# Patient Record
Sex: MALE | Employment: FULL TIME | URBAN - METROPOLITAN AREA
[De-identification: names, ages, dates, MRNs, and addresses within clinical notes are randomized per-mention and may not be internally consistent; named-entity substitution may affect disease eponyms.]

---

## 2018-10-16 RX ORDER — MONTELUKAST SODIUM 10 MG/1
TABLET ORAL
Qty: 90 TAB | Refills: 1 | Status: SHIPPED | OUTPATIENT
Start: 2018-10-16 | End: 2019-04-05 | Stop reason: SDUPTHER

## 2018-10-18 ENCOUNTER — CLINICAL SUPPORT (OUTPATIENT)
Dept: FAMILY MEDICINE CLINIC | Age: 23
End: 2018-10-18

## 2018-10-18 DIAGNOSIS — Z23 ENCOUNTER FOR IMMUNIZATION: Primary | ICD-10-CM

## 2018-10-18 NOTE — PROGRESS NOTES
Pasha Lin is a 26 y.o. male who presents for routine immunizations. He denies any symptoms , reactions or allergies that would exclude them from being immunized today. Risks and adverse reactions were discussed and the VIS was given to them. All questions were addressed. He was observed for 15   min post injection. There were no reactions observed.     Saloni Benitez LPN

## 2018-11-05 ENCOUNTER — OFFICE VISIT (OUTPATIENT)
Dept: FAMILY MEDICINE CLINIC | Age: 23
End: 2018-11-05

## 2018-11-05 VITALS
TEMPERATURE: 98.2 F | HEIGHT: 73 IN | DIASTOLIC BLOOD PRESSURE: 73 MMHG | OXYGEN SATURATION: 100 % | RESPIRATION RATE: 12 BRPM | SYSTOLIC BLOOD PRESSURE: 119 MMHG | HEART RATE: 80 BPM | WEIGHT: 168 LBS | BODY MASS INDEX: 22.26 KG/M2

## 2018-11-05 DIAGNOSIS — K62.5 RECTAL BLEEDING: Primary | ICD-10-CM

## 2018-11-05 LAB
HEMOCCULT STL QL: NEGATIVE
HGB BLD-MCNC: 14.7 G/DL
VALID INTERNAL CONTROL?: YES

## 2018-11-05 RX ORDER — GUANFACINE 4 MG/1
TABLET, EXTENDED RELEASE ORAL DAILY
COMMUNITY

## 2018-11-05 RX ORDER — ALBUTEROL SULFATE 90 UG/1
AEROSOL, METERED RESPIRATORY (INHALATION)
COMMUNITY
End: 2019-09-17 | Stop reason: SDUPTHER

## 2018-11-05 RX ORDER — GUANFACINE HYDROCHLORIDE 2 MG/1
TABLET ORAL DAILY
COMMUNITY
End: 2018-11-05

## 2018-11-05 RX ORDER — LORATADINE 10 MG/1
10 TABLET ORAL
COMMUNITY

## 2018-11-05 NOTE — PATIENT INSTRUCTIONS
Rectal Bleeding: Care Instructions  Your Care Instructions    Rectal bleeding in small amounts is common. You may see red spotting on toilet paper or drops of blood in the toilet. Rectal bleeding has many possible causes, from something as minor as hemorrhoids to something as serious as colon cancer. You may need more tests to find the cause of your bleeding. Follow-up care is a key part of your treatment and safety. Be sure to make and go to all appointments, and call your doctor if you are having problems. It's also a good idea to know your test results and keep a list of the medicines you take. How can you care for yourself at home? · Avoid aspirin and other nonsteroidal anti-inflammatory drugs (NSAIDs), such as ibuprofen (Advil, Motrin) and naproxen (Aleve). They can cause you to bleed more. Ask your doctor if you can take acetaminophen (Tylenol). Read and follow all instructions on the label. · Use a stool softener that contains bran or psyllium. You can save money by buying bran or psyllium (available in bulk at most health food stores) and sprinkling it on foods or stirring it into fruit juice. You can also use a product such as Metamucil or Citrucel. · Take your medicines exactly as directed. Call your doctor if you think you are having a problem with your medicine. When should you call for help? Call 911 anytime you think you may need emergency care. For example, call if:    · You passed out (lost consciousness).    Call your doctor now or seek immediate medical care if:    · You have new or worse pain.     · You have new or worse bleeding from the rectum.     · You are dizzy or light-headed, or you feel like you may faint.    Watch closely for changes in your health, and be sure to contact your doctor if:    · You cannot pass stools or gas.     · You do not get better as expected. Where can you learn more? Go to http://jair-tona.info/.   Enter Y789 in the search box to learn more about \"Rectal Bleeding: Care Instructions. \"  Current as of: March 28, 2018  Content Version: 11.8  © 7275-2203 [employer]wise, [employer]. Care instructions adapted under license by [employer] (which disclaims liability or warranty for this information). If you have questions about a medical condition or this instruction, always ask your healthcare professional. [address] any warranty or liability for your use of this information.

## 2018-11-05 NOTE — PROGRESS NOTES
I have reviewed the notes, assessments, and/or procedures performed, I concur with the residents documentation of Pasha Lin.

## 2018-11-05 NOTE — PROGRESS NOTES
Identified pt with two pt identifiers(name and ). No chief complaint on file. Health Maintenance Due   Topic    DTaP/Tdap/Td series (1 - Tdap)       Wt Readings from Last 3 Encounters:   18 168 lb (76.2 kg)     Temp Readings from Last 3 Encounters:   18 98.2 °F (36.8 °C) (Oral)     BP Readings from Last 3 Encounters:   18 119/73     Pulse Readings from Last 3 Encounters:   18 80         Learning Assessment:  :     No flowsheet data found. Depression Screening:  :     No flowsheet data found. Fall Risk Assessment:  :     No flowsheet data found. Abuse Screening:  :     No flowsheet data found. Coordination of Care Questionnaire:  :     1) Have you been to an emergency room, urgent care clinic since your last NO  2) Have you seen or consulted any other health care providers outside of [address] [place] [address] since your last visit? NO      Patient is accompanied by self and mother I have received verbal consent from Nargis Taveras to discuss any/all medical information while they are present in the room.

## 2018-11-05 NOTE — PROGRESS NOTES
Gayathri Sanchez is a 26 y.o. male who presents today for rectal bleeding. Patient presents today for BRBPR. Patient states that last Sunday he noticed blood in toilet water. This lasted until for 3 days and resolved. Most of the toilet water was bright red. This is not the first time this has happened. Previously he thought it was due to Topamax at the time. Reports some bright red blood in the stool as well and on the toilet paper. This only occurred during bowel movements. Has a BM daily. Stool not hard and declines constipation. No family history of colon cancer. No history of hemorrhoids. Denies cp, sob, abd pain, n/v, d/c.      ROS: (positive in bold)  General: wt loss, fever, chills, fatigue   Cardiac: chest pain, palpitations  Pul: SOB  GI: SEE HPI  Neuro: headache, lightheaded, dizziness. Past Medical History:  Past Medical History:   Diagnosis Date    Asthma     Headache        Past Surgical History:  Past Surgical History:   Procedure Laterality Date    HX APPENDECTOMY      HX ORTHOPAEDIC      FOREIGN BODY REMOVED FROM WRIST       Family History:  No family history on file. Allergies:  No Known Allergies    Social History:  Social History     Tobacco Use    Smoking status: Never Smoker    Smokeless tobacco: Never Used   Substance Use Topics    Alcohol use: Yes     Frequency: Monthly or less    Drug use: No       Current Meds:  Current Outpatient Medications on File Prior to Visit   Medication Sig Dispense Refill    loratadine (CLARITIN) 10 mg tablet Take 10 mg by mouth.  guanFACINE ER (INTUNIV) 4 mg Tb24 ER tablet Take  by mouth daily.  albuterol (PROVENTIL HFA, VENTOLIN HFA, PROAIR HFA) 90 mcg/actuation inhaler Take  by inhalation.  montelukast (SINGULAIR) 10 mg tablet TAKE 1 TABLET BY MOUTH EVERY DAY 90 Tab 1     No current facility-administered medications on file prior to visit.          Visit Vitals  /73   Pulse 80   Temp 98.2 °F (36.8 °C) (Oral)   Resp 12 Ht 6' 1\" (1.854 m)   Wt 168 lb (76.2 kg)   SpO2 100%   BMI 22.16 kg/m²       Gen:  Well developed, well nourished male in no acute distress  HEENT: normocephalic/atraumatic;EOMI  Card:  RRR, no m/r/g  Chest:  CTAB, no w/r/r  Abd:  BS+, Soft, nontender/nondistended. Normal rectal tone. No external hemorrhoids visualized. No masses palpated on exam. FOBT negative. Psych:  Nl mood and affect     Recent Results (from the past 12 hour(s))   AMB POC OCCULT, OTHER SOURCE    Collection Time: 11/05/18  4:45 PM   Result Value Ref Range    VALID INTERNAL CONTROL POC Yes     Hemoccult (POC) Negative Negative   AMB POC HEMOGLOBIN (HGB)    Collection Time: 11/05/18  4:50 PM   Result Value Ref Range    Hemoglobin (POC) 14.7            Assessment:      ICD-10-CM ICD-9-CM    1. Rectal bleeding K62.5 569.3 AMB POC HEMOGLOBIN (HGB)      REFERRAL TO GASTROENTEROLOGY      AMB POC OCCULT, OTHER SOURCE      Recent episode of BRBPR. DDx: hemorrhoids, crohn's, IBS, ulcer, colon cancer. FOBT negative in clinic today. Hgb was 14.7. Normal rectal tone without hemorrhoids on physical exam today. No family history of Colon Cancer. Mother does have IBS. Will send to GI for further evaluation given this is his second episode. He has to go to [facility] based on insurance. Referral given to patient. Discussed signs and symptoms of worsening bleeding and when to go to the emergency room. Plan:  - Referral to GI for further evaluation  - Return to clinic or go to the emergency room if bleeding persists or worsen. - Avoid NSAIDS. I have discussed the diagnosis with the patient and the intended plan as seen in the above orders. The patient has received an after-visit summary and questions were answered concerning future plans. The patient agrees and understands above plan. Follow-up Disposition:  Return if symptoms worsen or fail to improve. Patient discussed with supervising attending.     Angela Laurent DO

## 2019-04-05 RX ORDER — MONTELUKAST SODIUM 10 MG/1
TABLET ORAL
Qty: 90 TAB | Refills: 1 | Status: SHIPPED | OUTPATIENT
Start: 2019-04-05 | End: 2019-09-02 | Stop reason: SDUPTHER

## 2019-09-03 RX ORDER — MONTELUKAST SODIUM 10 MG/1
TABLET ORAL
Qty: 90 TAB | Refills: 1 | Status: SHIPPED | OUTPATIENT
Start: 2019-09-03 | End: 2019-09-17 | Stop reason: SDUPTHER

## 2019-09-17 ENCOUNTER — OFFICE VISIT (OUTPATIENT)
Dept: FAMILY MEDICINE CLINIC | Age: 24
End: 2019-09-17

## 2019-09-17 VITALS
TEMPERATURE: 97.7 F | DIASTOLIC BLOOD PRESSURE: 71 MMHG | HEART RATE: 74 BPM | OXYGEN SATURATION: 99 % | RESPIRATION RATE: 12 BRPM | SYSTOLIC BLOOD PRESSURE: 108 MMHG | WEIGHT: 185 LBS | BODY MASS INDEX: 24.52 KG/M2 | HEIGHT: 73 IN

## 2019-09-17 DIAGNOSIS — J45.20 MILD INTERMITTENT ASTHMA WITHOUT COMPLICATION: ICD-10-CM

## 2019-09-17 DIAGNOSIS — Z00.00 ANNUAL PHYSICAL EXAM: Primary | ICD-10-CM

## 2019-09-17 DIAGNOSIS — Z23 ENCOUNTER FOR IMMUNIZATION: ICD-10-CM

## 2019-09-17 DIAGNOSIS — F95.2 TOURETTE SYNDROME: ICD-10-CM

## 2019-09-17 DIAGNOSIS — K62.5 RECTAL BLEEDING: ICD-10-CM

## 2019-09-17 RX ORDER — MONTELUKAST SODIUM 10 MG/1
TABLET ORAL
Qty: 90 TAB | Refills: 1 | Status: SHIPPED | OUTPATIENT
Start: 2019-09-17 | End: 2020-03-27 | Stop reason: SDUPTHER

## 2019-09-17 RX ORDER — PIMOZIDE 1 MG/1
1 TABLET ORAL
COMMUNITY

## 2019-09-17 RX ORDER — ALBUTEROL SULFATE 90 UG/1
1 AEROSOL, METERED RESPIRATORY (INHALATION)
Qty: 1 INHALER | Refills: 2 | Status: SHIPPED | OUTPATIENT
Start: 2019-09-17 | End: 2020-03-27 | Stop reason: SDUPTHER

## 2019-09-17 RX ORDER — BACLOFEN 10 MG/1
TABLET ORAL
Refills: 11 | COMMUNITY
Start: 2019-08-14

## 2019-09-17 NOTE — PROGRESS NOTES
Patient stated name &     Chief Complaint   Patient presents with    Complete Physical     Requesting Flu injection        Health Maintenance Due   Topic    DTaP/Tdap/Td series (1 - Tdap)    Influenza Age 5 to Adult        Wt Readings from Last 3 Encounters:   19 185 lb (83.9 kg)   18 168 lb (76.2 kg)     Temp Readings from Last 3 Encounters:   19 97.7 °F (36.5 °C) (Oral)   18 98.2 °F (36.8 °C) (Oral)     BP Readings from Last 3 Encounters:   19 108/71   18 119/73     Pulse Readings from Last 3 Encounters:   19 74   18 80         Learning Assessment:  :     No flowsheet data found. Depression Screening:  :     3 most recent PHQ Screens 2019   Little interest or pleasure in doing things Not at all   Feeling down, depressed, irritable, or hopeless Not at all   Total Score PHQ 2 0       Fall Risk Assessment:  :     No flowsheet data found. Abuse Screening:  :     No flowsheet data found. Coordination of Care Questionnaire:  :     1) Have you been to an emergency room, urgent care clinic since your last visit? No    Hospitalized since your last visit? No             2) Have you seen or consulted any other health care providers outside of [address] [place] [address] since your last visit? No  (Include any pap smears or colon screenings in this section.)    Patient is accompanied by mother I have received verbal consent from Anuja Huggins to discuss any/all medical information while they are present in the room. Flu injection ordered by Francisca Corbin NP given 2019 by Elsa Mendoza as follows:    Dose amount:  0.5 ml  Injection site:  deltoid ( left)  Route:  IM  Lot:  [id number]  Exp:  20  NDC:  62269-834-56  [employer]      Patient tolerated injection well.

## 2019-09-17 NOTE — PROGRESS NOTES
Assessment/Plan:     Diagnoses and all orders for this visit:    1. Annual physical exam   -stable, continue active lifestyle. Follow up in 1 year for CPE    2. Encounter for immunization  -     INFLUENZA VIRUS VAC QUAD,SPLIT,PRESV FREE SYRINGE IM    3. Tourette syndrome   -managed by specialist    4. Mild intermittent asthma without complication  -     albuterol (PROVENTIL HFA, VENTOLIN HFA, PROAIR HFA) 90 mcg/actuation inhaler; Take 1 Puff by inhalation every four (4) hours as needed for Wheezing.  -     montelukast (SINGULAIR) 10 mg tablet; TAKE 1 TABLET BY MOUTH EVERY DAY  - Stable, continue current therapy    5. Rectal bleeding  - Stable, no recent bleeding. scheduled with GI at [facility]            Discussed expected course/resolution/complications of diagnosis in detail with patient. Medication risks/benefits/costs/interactions/alternatives discussed with patient. Pt was given after visit summary which includes diagnoses, current medications & vitals. Pt expressed understanding with the diagnosis and plan        Subjective:      Yelena Ortiz is a 22 y.o. male who presents for had concerns including Complete Physical (Requesting Flu injection). Here today for annual CPE. History of tourette's syndrome managed by [facility]. Sees dentist every 6 months. Last vision exam has been years he states and denies problem with vision. States he does not exercise. States diet is fairly balanced. Minimal alcohol use. Denies smoking    Has a history of rectal bleeding. Has not seen GI in the past, has made an apt. POC Occult was  Negative and HGB was 14.7. States the bleeding varies and it is not every day. Denies diarrhea, constipation, abdominal pain, fevers, acid reflux. Never been diagnosed with hemorrhoids. No bleeding in the last 2 weeks.      Current Outpatient Medications   Medication Sig Dispense Refill    baclofen (LIORESAL) 10 mg tablet TAKE 1 TABLET BY MOUTH 3 TIMES A DAY AS NEEDED FOR MOTOR TICS  11    pimozide (ORAP) 1 mg tablet Take 1 mg by mouth.  albuterol (PROVENTIL HFA, VENTOLIN HFA, PROAIR HFA) 90 mcg/actuation inhaler Take 1 Puff by inhalation every four (4) hours as needed for Wheezing. 1 Inhaler 2    montelukast (SINGULAIR) 10 mg tablet TAKE 1 TABLET BY MOUTH EVERY DAY 90 Tab 1    loratadine (CLARITIN) 10 mg tablet Take 10 mg by mouth.  guanFACINE ER (INTUNIV) 4 mg Tb24 ER tablet Take  by mouth daily. Allergies   Allergen Reactions    Atomoxetine Hives    Penicillins Hives    Sulfa (Sulfonamide Antibiotics) Hives     Past Medical History:   Diagnosis Date    Asthma     Headache      Past Surgical History:   Procedure Laterality Date    HX APPENDECTOMY      HX ORTHOPAEDIC      FOREIGN BODY REMOVED FROM WRIST     No family history on file.   Social History     Socioeconomic History    Marital status: UNKNOWN     Spouse name: Not on file    Number of children: Not on file    Years of education: Not on file    Highest education level: Not on file   Occupational History    Not on file   Social Needs    Financial resource strain: Not on file    Food insecurity:     Worry: Not on file     Inability: Not on file    Transportation needs:     Medical: Not on file     Non-medical: Not on file   Tobacco Use    Smoking status: Never Smoker    Smokeless tobacco: Never Used   Substance and Sexual Activity    Alcohol use: Yes     Frequency: Monthly or less    Drug use: No    Sexual activity: Never   Lifestyle    Physical activity:     Days per week: Not on file     Minutes per session: Not on file    Stress: Not on file   Relationships    Social connections:     Talks on phone: Not on file     Gets together: Not on file     Attends [employer] service: Not on file     Active member of club or organization: Not on file     Attends meetings of clubs or organizations: Not on file     Relationship status: Not on file    Intimate partner violence:     Fear of current or ex partner: Not on file     Emotionally abused: Not on file     Physically abused: Not on file     Forced sexual activity: Not on file   Other Topics Concern    Not on file   Social History Narrative    Not on file       HPI      ROS:   Review of Systems   Constitutional: Negative for chills, fever and malaise/fatigue. HENT: Negative for congestion. Respiratory: Negative for cough and shortness of breath. Cardiovascular: Negative for chest pain, palpitations and leg swelling. Gastrointestinal: Positive for blood in stool. Negative for abdominal pain, constipation, diarrhea, heartburn, melena, nausea and vomiting. Genitourinary: Negative for dysuria, flank pain, frequency, hematuria and urgency. Musculoskeletal: Negative for back pain. Neurological: Positive for headaches. Negative for dizziness. Psychiatric/Behavioral: Negative for depression. The patient is nervous/anxious. Objective:     Visit Vitals  /71   Pulse 74   Temp 97.7 °F (36.5 °C) (Oral)   Resp 12   Ht 6' 1\" (1.854 m)   Wt 185 lb (83.9 kg)   SpO2 99%   BMI 24.41 kg/m²         Vitals and Nurse Documentation reviewed. Physical Exam   Constitutional: He is well-developed, well-nourished, and in no distress. HENT:   Head: Normocephalic and atraumatic. Right Ear: Tympanic membrane normal.   Left Ear: Tympanic membrane normal.   Nose: Nose normal.   Mouth/Throat: Oropharynx is clear and moist. Normal dentition. Eyes: Pupils are equal, round, and reactive to light. EOM are normal. Right eye exhibits no discharge. Left eye exhibits no discharge. Right conjunctiva is not injected. Left conjunctiva is not injected. Neck: Neck supple. Carotid bruit is not present. No thyroid mass and no thyromegaly present. Cardiovascular: Normal rate, regular rhythm, S1 normal and S2 normal. Exam reveals no gallop and no friction rub. No murmur heard.   Pulses:       Dorsalis pedis pulses are 2+ on the right side, and 2+ on the left side.        Posterior tibial pulses are 2+ on the right side, and 2+ on the left side. Pulmonary/Chest: Breath sounds normal.   Abdominal: Normal appearance and bowel sounds are normal. He exhibits no distension and no mass. There is no hepatosplenomegaly. There is no tenderness. Musculoskeletal: Normal range of motion. Lymphadenopathy:     He has no cervical adenopathy. Neurological: He is alert. He has normal sensation and normal strength. Gait normal. Gait normal.   Skin: Skin is warm, dry and intact. No rash noted.    Psychiatric: Mood and affect normal.       Results for orders placed or performed in visit on 11/05/18   AMB POC HEMOGLOBIN (HGB)   Result Value Ref Range    Hemoglobin (POC) 14.7    AMB POC OCCULT, OTHER SOURCE   Result Value Ref Range    VALID INTERNAL CONTROL POC Yes     Hemoccult (POC) Negative Negative

## 2020-03-27 DIAGNOSIS — J45.20 MILD INTERMITTENT ASTHMA WITHOUT COMPLICATION: ICD-10-CM

## 2020-03-27 RX ORDER — MONTELUKAST SODIUM 10 MG/1
TABLET ORAL
Qty: 90 TAB | Refills: 1 | Status: SHIPPED | OUTPATIENT
Start: 2020-03-27 | End: 2020-09-21 | Stop reason: SDUPTHER

## 2020-03-27 RX ORDER — ALBUTEROL SULFATE 90 UG/1
1 AEROSOL, METERED RESPIRATORY (INHALATION)
Qty: 1 INHALER | Refills: 2 | Status: SHIPPED | OUTPATIENT
Start: 2020-03-27 | End: 2020-07-10 | Stop reason: SDUPTHER

## 2020-07-10 DIAGNOSIS — J45.20 MILD INTERMITTENT ASTHMA WITHOUT COMPLICATION: ICD-10-CM

## 2020-07-10 RX ORDER — ALBUTEROL SULFATE 90 UG/1
1 AEROSOL, METERED RESPIRATORY (INHALATION)
Qty: 1 INHALER | Refills: 2 | Status: SHIPPED | OUTPATIENT
Start: 2020-07-10 | End: 2020-10-19 | Stop reason: SDUPTHER

## 2020-09-21 DIAGNOSIS — J45.20 MILD INTERMITTENT ASTHMA WITHOUT COMPLICATION: ICD-10-CM

## 2020-09-22 RX ORDER — MONTELUKAST SODIUM 10 MG/1
TABLET ORAL
Qty: 30 TAB | Refills: 0 | Status: SHIPPED | OUTPATIENT
Start: 2020-09-22 | End: 2020-10-19 | Stop reason: SDUPTHER

## 2020-10-19 ENCOUNTER — OFFICE VISIT (OUTPATIENT)
Dept: FAMILY MEDICINE CLINIC | Age: 25
End: 2020-10-19
Payer: COMMERCIAL

## 2020-10-19 VITALS
HEIGHT: 73 IN | TEMPERATURE: 98.1 F | OXYGEN SATURATION: 97 % | WEIGHT: 179.2 LBS | BODY MASS INDEX: 23.75 KG/M2 | DIASTOLIC BLOOD PRESSURE: 77 MMHG | HEART RATE: 79 BPM | RESPIRATION RATE: 16 BRPM | SYSTOLIC BLOOD PRESSURE: 115 MMHG

## 2020-10-19 DIAGNOSIS — Z00.00 ENCOUNTER FOR ANNUAL PHYSICAL EXAM: Primary | ICD-10-CM

## 2020-10-19 DIAGNOSIS — Z23 NEEDS FLU SHOT: ICD-10-CM

## 2020-10-19 DIAGNOSIS — J45.20 MILD INTERMITTENT ASTHMA WITHOUT COMPLICATION: ICD-10-CM

## 2020-10-19 PROCEDURE — 90471 IMMUNIZATION ADMIN: CPT | Performed by: FAMILY MEDICINE

## 2020-10-19 PROCEDURE — 90686 IIV4 VACC NO PRSV 0.5 ML IM: CPT | Performed by: FAMILY MEDICINE

## 2020-10-19 PROCEDURE — 99395 PREV VISIT EST AGE 18-39: CPT | Performed by: FAMILY MEDICINE

## 2020-10-19 RX ORDER — MONTELUKAST SODIUM 10 MG/1
TABLET ORAL
Qty: 90 TAB | Refills: 3 | Status: SHIPPED | OUTPATIENT
Start: 2020-10-19 | End: 2021-10-18 | Stop reason: SDUPTHER

## 2020-10-19 RX ORDER — ALBUTEROL SULFATE 90 UG/1
1 AEROSOL, METERED RESPIRATORY (INHALATION)
Qty: 1 INHALER | Refills: 2 | Status: SHIPPED | OUTPATIENT
Start: 2020-10-19

## 2020-10-19 RX ORDER — BUSPIRONE HYDROCHLORIDE 7.5 MG/1
TABLET ORAL
COMMUNITY
Start: 2020-08-10

## 2020-10-19 NOTE — PROGRESS NOTES
Karen Speaker  26 y.o. male  1995  GQN:[id number]    [facility]  Progress Note     Encounter Date: 10/19/2020    Assessment and Plan:     Encounter Diagnoses     ICD-10-CM ICD-9-CM   1. Encounter for annual physical exam  Z00.00 V70.0   2. Needs flu shot  Z23 V04.81   3. Mild intermittent asthma without complication  C95.15 889.39       1. Encounter for annual physical exam  2. Needs flu shot  - INFLUENZA VIRUS VAC QUAD,SPLIT,PRESV FREE SYRINGE IM    3. Mild intermittent asthma without complication  Controlled. - montelukast (SINGULAIR) 10 mg tablet; TAKE 1 TABLET BY MOUTH EVERY DAY  Dispense: 90 Tab; Refill: 3  - albuterol (PROVENTIL HFA, VENTOLIN HFA, PROAIR HFA) 90 mcg/actuation inhaler; Take 1 Puff by inhalation every four (4) hours as needed for Wheezing. Dispense: 1 Inhaler; Refill: 2      I have discussed the diagnosis with the patient and the intended plan as seen in the above orders. he has expressed understanding. The patient has received an after-visit summary and questions were answered concerning future plans. I have discussed medication side effects and warnings with the patient as well. Electronically Signed: Xander Kinsey MD    Current Medications after this visit     Current Outpatient Medications   Medication Sig    busPIRone (BUSPAR) 7.5 mg tablet TAKE 1 TABLET BY MOUTH THREE TIMES A DAY    montelukast (SINGULAIR) 10 mg tablet TAKE 1 TABLET BY MOUTH EVERY DAY    albuterol (PROVENTIL HFA, VENTOLIN HFA, PROAIR HFA) 90 mcg/actuation inhaler Take 1 Puff by inhalation every four (4) hours as needed for Wheezing.  baclofen (LIORESAL) 10 mg tablet TAKE 1 TABLET BY MOUTH 3 TIMES A DAY AS NEEDED FOR MOTOR TICS    pimozide (ORAP) 1 mg tablet Take 1 mg by mouth.  loratadine (CLARITIN) 10 mg tablet Take 10 mg by mouth.  guanFACINE ER (INTUNIV) 4 mg Tb24 ER tablet Take  by mouth daily.      No current facility-administered medications for this visit. Medications Discontinued During This Encounter   Medication Reason    montelukast (SINGULAIR) 10 mg tablet Reorder    albuterol (PROVENTIL HFA, VENTOLIN HFA, PROAIR HFA) 90 mcg/actuation inhaler Reorder     ~~~~~~~~~~~~~~~~~~~~~~~~~~~~~~~~~~~~~~~~~~~~~~    Chief Complaint   Patient presents with    Complete Physical    Immunization/Injection     Pt requesting flu vaccine       History provided by patient  History of Present Illness   Kim Leon is a 26 y.o. male who presents to clinic today for:    Complete physical  Patient present with cc of complete physical.   Patient reports that he has a loss of strength when is going to bed at night and on awakening from sleep. He has discussed this with his neurologist and had a normal EMG. Health Maintenance  Health Maintenance Due   Topic Date Due    Pneumococcal 0-64 years (1 of 1 - PPSV23) 09/16/2002    Flu Vaccine (1) 09/01/2020     Review of Systems   Review of Systems   Constitutional: Negative for chills and fever. HENT: Negative for congestion, ear discharge and sore throat. Eyes: Negative for double vision, photophobia and discharge. Respiratory: Negative for cough, sputum production, shortness of breath and wheezing. Cardiovascular: Negative for chest pain, palpitations and leg swelling. Gastrointestinal: Negative for diarrhea, nausea and vomiting. Genitourinary: Negative for dysuria and urgency. Skin: Negative. Neurological: Negative for dizziness, tremors and headaches. Vitals/Objective:     Vitals:    10/19/20 1539   BP: 115/77   Pulse: 79   Resp: 16   Temp: 98.1 °F (36.7 °C)   TempSrc: Oral   SpO2: 97%   Weight: 179 lb 3.2 oz (81.3 kg)   Height: 6' 1\" (1.854 m)     Body mass index is 23.64 kg/m². Wt Readings from Last 3 Encounters:   10/19/20 179 lb 3.2 oz (81.3 kg)   09/17/19 185 lb (83.9 kg)   11/05/18 168 lb (76.2 kg)       Physical Exam  Constitutional:       General: He is not in acute distress. Appearance: Normal appearance. He is well-developed. He is not diaphoretic. HENT:      Head: Normocephalic and atraumatic. Right Ear: External ear normal.      Left Ear: External ear normal.      Mouth/Throat:      Pharynx: No oropharyngeal exudate or posterior oropharyngeal erythema. Eyes:      General:         Right eye: No discharge. Left eye: No discharge. Conjunctiva/sclera: Conjunctivae normal.   Cardiovascular:      Rate and Rhythm: Normal rate and regular rhythm. Heart sounds: S1 normal and S2 normal. No murmur. Pulmonary:      Effort: Pulmonary effort is normal.      Breath sounds: Normal breath sounds. No rales. Musculoskeletal:      Right lower leg: No edema. Left lower leg: No edema. Skin:     General: Skin is warm and dry. Neurological:      Mental Status: He is alert and oriented to person, place, and time. No results found for this or any previous visit (from the past 24 hour(s)). Disposition     Follow-up and Dispositions  ·   Return in about 1 year (around 10/19/2021) for Annual physical. .       No future appointments. History   Patient's past medical, surgical and family histories were reviewed and updated. Past Medical History:   Diagnosis Date    Asthma     Headache      Past Surgical History:   Procedure Laterality Date    HX APPENDECTOMY      HX ORTHOPAEDIC      FOREIGN BODY REMOVED FROM WRIST     History reviewed. No pertinent family history.   Social History     Tobacco Use    Smoking status: Never Smoker    Smokeless tobacco: Never Used   Substance Use Topics    Alcohol use: Yes     Frequency: Monthly or less    Drug use: No       Allergies     Allergies   Allergen Reactions    Atomoxetine Hives    Penicillins Hives    Sulfa (Sulfonamide Antibiotics) Hives

## 2020-10-19 NOTE — PROGRESS NOTES
Identified pt with two pt identifiers(name and ). Reviewed record in preparation for visit and have obtained necessary documentation. Chief Complaint   Patient presents with    Complete Physical    Immunization/Injection     Pt requesting flu vaccine        Health Maintenance Due   Topic    Pneumococcal 0-64 years (1 of 1 - PPSV23)    Flu Vaccine (1)       Coordination of Care Questionnaire:  :   1) Have you been to an emergency room, urgent care, or hospitalized since your last visit? If yes, where when, and reason for visit? Yes, [facility] for ingrown rt big toe nail       2. Have seen or consulted any other health care provider since your last visit? If yes, where when, and reason for visit?  no        Patient is accompanied by self I have received verbal consent from Khari Nieto to discuss any/all medical information while they are present in the room.

## 2020-10-19 NOTE — PATIENT INSTRUCTIONS
Vaccine Information Statement Influenza (Flu) Vaccine (Inactivated or Recombinant): What You Need to Know Many Vaccine Information Statements are available in [place] sor[place] and other languages. See www.immunize.org/vis Hojas de información sobre vacunas están disponibles en español y en muchos otros idiomas. Visite www.immunize.org/vis 1. Why get vaccinated? Influenza vaccine can prevent influenza (flu). Flu is a contagious disease that spreads around the [facility] every year, usually between October and May. Anyone can get the flu, but it is more dangerous for some people. Infants and young children, people 72years of age and older, pregnant women, and people with certain health conditions or a weakened immune system are at greatest risk of flu complications. Pneumonia, bronchitis, sinus infections and ear infections are examples of flu-related complications. If you have a medical condition, such as heart disease, cancer or diabetes, flu can make it worse. Flu can cause fever and chills, sore throat, muscle aches, fatigue, cough, headache, and runny or stuffy nose. Some people may have vomiting and diarrhea, though this is more common in children than adults. Each year thousands of people in the [facility] die from flu, and many more are hospitalized. Flu vaccine prevents millions of illnesses and flu-related visits to the doctor each year. 2. Influenza vaccines CDC recommends everyone 10months of age and older get vaccinated every flu season. Children 6 months through 6years of age may need 2 doses during a single flu season. Everyone else needs only 1 dose each flu season. It takes about 2 weeks for protection to develop after vaccination. There are many flu viruses, and they are always changing. Each year a new flu vaccine is made to protect against three or four viruses that are likely to cause disease in the upcoming flu season.  Even when the vaccine doesnt exactly match these viruses, it may still provide some protection. Influenza vaccine does not cause flu. Influenza vaccine may be given at the same time as other vaccines. 3. Talk with your health care provider Tell your vaccine provider if the person getting the vaccine: 
 Has had an allergic reaction after a previous dose of influenza vaccine, or has any severe, life-threatening allergies.  Has ever had Guillain-Barré Syndrome (also called GBS). In some cases, your health care provider may decide to postpone influenza vaccination to a future visit. People with minor illnesses, such as a cold, may be vaccinated. People who are moderately or severely ill should usually wait until they recover before getting influenza vaccine. Your health care provider can give you more information. 4. Risks of a reaction  Soreness, redness, and swelling where shot is given, fever, muscle aches, and headache can happen after influenza vaccine.  There may be a very small increased risk of Guillain-Barré Syndrome (GBS) after inactivated influenza vaccine (the flu shot). Chani Lennon children who get the flu shot along with pneumococcal vaccine (PCV13), and/or DTaP vaccine at the same time might be slightly more likely to have a seizure caused by fever. Tell your health care provider if a child who is getting flu vaccine has ever had a seizure. People sometimes faint after medical procedures, including vaccination. Tell your provider if you feel dizzy or have vision changes or ringing in the ears. As with any medicine, there is a very remote chance of a vaccine causing a severe allergic reaction, other serious injury, or death. 5. What if there is a serious problem? An allergic reaction could occur after the vaccinated person leaves the clinic.  If you see signs of a severe allergic reaction (hives, swelling of the face and throat, difficulty breathing, a fast heartbeat, dizziness, or weakness), call 9-1-1 and get the person to the nearest hospital. 
 
For other signs that concern you, call your health care provider. Adverse reactions should be reported to the Vaccine Adverse Event Reporting System (VAERS). Your health care provider will usually file this report, or you can do it yourself. Visit the VAERS website at www.vaers. hhs.gov or call 5-131.138.8154. VAERS is only for reporting reactions, and VAERS staff do not give medical advice. 6. The National Vaccine Injury Compensation Program 
 
The [facility] Vaccine Injury Compensation Program (VICP) is a federal program that was created to compensate people who may have been injured by certain vaccines. Visit the VICP website at www.hrsa.gov/vaccinecompensation or call 1-854.732.7567 to learn about the program and about filing a claim. There is a time limit to file a claim for compensation. 7. How can I learn more?  Ask your health care provider.  Call your local or state health department.  Contact the Centers for Disease Control and Prevention (CDC): 
- Call 5-586.165.3470 (1-800-CDC-INFO) or 
- Visit CDCs influenza website at www.cdc.gov/flu Vaccine Information Statement (Interim) Inactivated Influenza Vaccine 8/15/2019 
42 GIUSEPPE Estrada [id number] Department of Health and [employer] Centers for Disease Control and Prevention Office Use Only

## 2021-10-20 ENCOUNTER — OFFICE VISIT (OUTPATIENT)
Dept: FAMILY MEDICINE CLINIC | Age: 26
End: 2021-10-20
Payer: COMMERCIAL

## 2021-10-20 VITALS
BODY MASS INDEX: 25 KG/M2 | DIASTOLIC BLOOD PRESSURE: 63 MMHG | OXYGEN SATURATION: 96 % | TEMPERATURE: 97.8 F | RESPIRATION RATE: 20 BRPM | SYSTOLIC BLOOD PRESSURE: 96 MMHG | WEIGHT: 188.6 LBS | HEIGHT: 73 IN | HEART RATE: 72 BPM

## 2021-10-20 DIAGNOSIS — J45.20 MILD INTERMITTENT ASTHMA WITHOUT COMPLICATION: ICD-10-CM

## 2021-10-20 DIAGNOSIS — Z00.00 ANNUAL PHYSICAL EXAM: Primary | ICD-10-CM

## 2021-10-20 PROBLEM — M54.9 BACK PAIN: Status: ACTIVE | Noted: 2021-10-20

## 2021-10-20 PROBLEM — M41.9 SCOLIOSIS: Status: ACTIVE | Noted: 2021-10-20

## 2021-10-20 PROBLEM — R51.9 HEADACHE: Status: ACTIVE | Noted: 2021-10-20

## 2021-10-20 PROBLEM — F95.2 TOURETTE'S SYNDROME: Status: ACTIVE | Noted: 2021-10-20

## 2021-10-20 PROBLEM — K59.00 CONSTIPATION: Status: ACTIVE | Noted: 2021-10-20

## 2021-10-20 PROBLEM — J45.909 ASTHMA: Status: ACTIVE | Noted: 2021-10-20

## 2021-10-20 PROBLEM — K62.5 RECTAL BLEEDING: Status: ACTIVE | Noted: 2021-10-20

## 2021-10-20 PROCEDURE — 99395 PREV VISIT EST AGE 18-39: CPT | Performed by: FAMILY MEDICINE

## 2021-10-20 RX ORDER — HYDROXYZINE PAMOATE 25 MG/1
CAPSULE ORAL
COMMUNITY
Start: 2021-10-04 | End: 2022-07-28 | Stop reason: ALTCHOICE

## 2021-10-20 RX ORDER — MONTELUKAST SODIUM 10 MG/1
10 TABLET ORAL DAILY
Qty: 90 TABLET | Refills: 3 | Status: SHIPPED | OUTPATIENT
Start: 2021-10-20 | End: 2022-07-18 | Stop reason: SDUPTHER

## 2021-10-20 RX ORDER — ESCITALOPRAM OXALATE 5 MG/1
TABLET ORAL
COMMUNITY
Start: 2021-07-22

## 2021-10-20 NOTE — PROGRESS NOTES
Moy Gongora is a 23 y.o. male  Presenting for his annual checkup and health maintenance review and follow-up  Overall, doing well. No concerns today. Last colonoscopy never. UTD on dental and vision. UTD on influenza. Patient is getting no exercise. Trying to eat a well balanced diet. Patient reports intermittent blood in stool. Declines fit test today, declines stool studies today. Reports he has been seen by a gastroenterologist at [facility] in the past for evaluation of blood in stool. Denies constipation. I discussed with patient if he notices blood in stool again we can do further work-up. Is currently not having any blood in stool at this time. Health Maintenance   Topic    Hepatitis C Screening     Flu Vaccine (1)    DTaP/Tdap/Td series (8 - Td or Tdap)    COVID-19 Vaccine     Pneumococcal 0-64 years        Health Maintenance reviewed        Vaccinations reviewed  Immunization History   Administered Date(s) Administered    Covid-19, MODERNA, Mrna, Lnp-s, Pf, 100mcg/0.5mL 04/02/2021, 04/30/2021    DTaP 1996, 01/30/1997, 03/14/1997, 05/04/1998, 05/01/2001    HPV 09/26/2012, 12/05/2012, 04/05/2013    Hep A Vaccine 09/14/2011, 09/26/2012    Hep B Vaccine 1996, 01/30/1997, 06/12/1997    Hib (PRP-T) 1996, 01/30/1997, 03/14/1997, 09/18/1997    IPV 1996, 01/30/1997, 03/14/1997, 05/01/2001    Influenza Vaccine 10/17/2017    Influenza Vaccine (Quad) PF (>6 Mo Flulaval, Fluarix, and >3 Yrs [address] [place] [address], [place] [address]) 10/18/2018, 09/17/2019, 10/19/2020    MMR 09/18/1997, 12/17/1997, 05/01/2001, 09/14/2011    Meningococcal (MCV4) Vaccine 09/14/2011, 06/04/2014    TB Skin Test (PPD) 06/27/2007    Tdap 06/27/2007, 07/16/2018       Past Medical History:   Diagnosis Date    Asthma     Headache       has a past surgical history that includes hx appendectomy and hx orthopaedic.     Atomoxetine, Doxycycline, Penicillins, and Sulfa (sulfonamide antibiotics)   Current Outpatient Medications   Medication Sig    escitalopram oxalate (LEXAPRO) 5 mg tablet     hydrOXYzine pamoate (VISTARIL) 25 mg capsule     montelukast (SINGULAIR) 10 mg tablet Take 1 Tablet by mouth daily. TAKE 1 TABLET BY MOUTH EVERY DAY    busPIRone (BUSPAR) 7.5 mg tablet TAKE 1 TABLET BY MOUTH THREE TIMES A DAY    albuterol (PROVENTIL HFA, VENTOLIN HFA, PROAIR HFA) 90 mcg/actuation inhaler Take 1 Puff by inhalation every four (4) hours as needed for Wheezing.  baclofen (LIORESAL) 10 mg tablet TAKE 1 TABLET BY MOUTH 3 TIMES A DAY AS NEEDED FOR MOTOR TICS    pimozide (ORAP) 1 mg tablet Take 1 mg by mouth.  loratadine (CLARITIN) 10 mg tablet Take 10 mg by mouth.  guanFACINE ER (INTUNIV) 4 mg Tb24 ER tablet Take  by mouth daily. No current facility-administered medications for this visit. SOCIAL HX:  reports that he has never smoked. He has never used smokeless tobacco. He reports current alcohol use. He reports that he does not use drugs. FAMILY HX: family history is not on file. Problem List  Date Reviewed: 10/20/2021        Codes Class Noted    Scoliosis ICD-10-CM: M41.9  ICD-9-CM: 737.30  10/20/2021        Rectal bleeding ICD-10-CM: K62.5  ICD-9-CM: 569.3  10/20/2021        Headache ICD-10-CM: R51.9  ICD-9-CM: 784.0  10/20/2021        Constipation ICD-10-CM: K59.00  ICD-9-CM: 564.00  10/20/2021        Back pain ICD-10-CM: M54.9  ICD-9-CM: 724.5  10/20/2021        Asthma ICD-10-CM: J45.909  ICD-9-CM: 493.90  10/20/2021        Tourette's syndrome ICD-10-CM: O12.6  ICD-9-CM: 307.23  10/20/2021        Tourette syndrome ICD-10-CM: X06.5  ICD-9-CM: 307.23  9/17/2019              Patient Care Team:  Michelle Adam MD as PCP - General (Family Medicine)    Review of Systems   Constitutional: Negative. HENT: Negative. Eyes: Negative. Respiratory: Negative. Cardiovascular: Negative. Gastrointestinal: Negative. Genitourinary: Negative. Musculoskeletal: Negative. Skin: Negative. Neurological: Negative. Endo/Heme/Allergies: Negative. Psychiatric/Behavioral: Negative. Physical Exam  Constitutional:       Appearance: Normal appearance. HENT:      Right Ear: Tympanic membrane, ear canal and external ear normal.      Left Ear: Tympanic membrane, ear canal and external ear normal.   Eyes:      Extraocular Movements: Extraocular movements intact. Conjunctiva/sclera: Conjunctivae normal.      Pupils: Pupils are equal, round, and reactive to light. Neck:      Thyroid: No thyromegaly. Cardiovascular:      Rate and Rhythm: Normal rate and regular rhythm. Pulmonary:      Effort: Pulmonary effort is normal.      Breath sounds: Normal breath sounds. Abdominal:      General: Bowel sounds are normal. There is no distension. Palpations: Abdomen is soft. Tenderness: There is no abdominal tenderness. Musculoskeletal:         General: Normal range of motion. Cervical back: Normal range of motion and neck supple. Right lower leg: No edema. Left lower leg: No edema. Lymphadenopathy:      Cervical: No cervical adenopathy. Skin:     General: Skin is warm and dry. Neurological:      General: No focal deficit present. Mental Status: He is alert and oriented to person, place, and time. Mental status is at baseline. Psychiatric:         Mood and Affect: Mood normal.         Behavior: Behavior normal.            Assessment/Plan    1. Annual physical exam  Patient gets lab work done at [facility]. He sees neurologist at [facility]. I will get records. Declines lab work today. 2. Mild intermittent asthma without complication    - montelukast (SINGULAIR) 10 mg tablet; Take 1 Tablet by mouth daily. TAKE 1 TABLET BY MOUTH EVERY DAY  Dispense: 90 Tablet; Refill: 3      23 y.o. male who presents today for annual exam. UTD on screening. UTD on vaccines. Will check routine labs. Encouraged daily exercise and trying to eat a well balanced diet.      I have discussed the diagnosis with the patient and the intended plan as seen in the above orders. The patient has received an after-visit summary and questions were answered concerning future plans. I have discussed medication side effects and warnings with the patient as well. The patient agrees and understands above plan.       Follow-up and Dispositions    · Return in about 1 year (around 10/20/2022) for Sasha Avendano MD

## 2021-10-20 NOTE — PROGRESS NOTES
Patient stated name &     Chief Complaint   Patient presents with    Complete Physical        Health Maintenance Due   Topic    Hepatitis C Screening     Flu Vaccine (1)       Wt Readings from Last 3 Encounters:   10/20/21 188 lb 9.6 oz (85.5 kg)   10/19/20 179 lb 3.2 oz (81.3 kg)   19 185 lb (83.9 kg)     Temp Readings from Last 3 Encounters:   10/20/21 97.8 °F (36.6 °C) (Temporal)   10/19/20 98.1 °F (36.7 °C) (Oral)   19 97.7 °F (36.5 °C) (Oral)     BP Readings from Last 3 Encounters:   10/20/21 96/63   10/19/20 115/77   19 108/71     Pulse Readings from Last 3 Encounters:   10/20/21 72   10/19/20 79   19 74         Learning Assessment:  :     Learning Assessment 2019   PRIMARY LEARNER Patient   BARRIERS PRIMARY LEARNER NONE   CO-LEARNER CAREGIVER No   PRIMARY LANGUAGE ENGLISH   LEARNER PREFERENCE PRIMARY DEMONSTRATION   ANSWERED BY self   RELATIONSHIP SELF       Depression Screening:  :     3 most recent PHQ Screens 10/20/2021   Little interest or pleasure in doing things Not at all   Feeling down, depressed, irritable, or hopeless Not at all   Total Score PHQ 2 0       Fall Risk Assessment:  :     No flowsheet data found. Abuse Screening:  :     Abuse Screening Questionnaire 10/20/2021 2019   Do you ever feel afraid of your partner? N N   Are you in a relationship with someone who physically or mentally threatens you? N N   Is it safe for you to go home? Y Y       Coordination of Care Questionnaire:  :     1) Have you been to an emergency room, urgent care clinic since your last visit? No    Hospitalized since your last visit? No             2) Have you seen or consulted any other health care providers outside of [address] [place] [address] since your last visit? No  (Include any pap smears or colon screenings in this section.)    3) Do you have an Advance Directive on file?  No    Patient is accompanied by self I have received verbal consent from Tiana Garcia to discuss any/all medical information while they are present in the room.

## 2021-12-08 ENCOUNTER — TELEPHONE (OUTPATIENT)
Dept: FAMILY MEDICINE CLINIC | Age: 26
End: 2021-12-08

## 2021-12-08 NOTE — TELEPHONE ENCOUNTER
Faxed medical records request    Dr. Magi Monaco, Psychiatrist  [address] [place] [address] Neurology  F

## 2022-03-18 PROBLEM — R51.9 HEADACHE: Status: ACTIVE | Noted: 2021-10-20

## 2022-03-18 PROBLEM — F95.2 TOURETTE SYNDROME: Status: ACTIVE | Noted: 2019-09-17

## 2022-03-18 PROBLEM — F95.2 TOURETTE'S SYNDROME: Status: ACTIVE | Noted: 2021-10-20

## 2022-03-19 PROBLEM — J45.909 ASTHMA: Status: ACTIVE | Noted: 2021-10-20

## 2022-03-19 PROBLEM — M41.9 SCOLIOSIS: Status: ACTIVE | Noted: 2021-10-20

## 2022-03-19 PROBLEM — K59.00 CONSTIPATION: Status: ACTIVE | Noted: 2021-10-20

## 2022-03-20 PROBLEM — M54.9 BACK PAIN: Status: ACTIVE | Noted: 2021-10-20

## 2022-03-20 PROBLEM — K62.5 RECTAL BLEEDING: Status: ACTIVE | Noted: 2021-10-20

## 2022-07-18 ENCOUNTER — VIRTUAL VISIT (OUTPATIENT)
Dept: FAMILY MEDICINE CLINIC | Age: 27
End: 2022-07-18
Payer: COMMERCIAL

## 2022-07-18 DIAGNOSIS — J45.20 MILD INTERMITTENT ASTHMA WITHOUT COMPLICATION: ICD-10-CM

## 2022-07-18 PROCEDURE — 99443 PR PHYS/QHP TELEPHONE EVALUATION 21-30 MIN: CPT | Performed by: FAMILY MEDICINE

## 2022-07-18 RX ORDER — MONTELUKAST SODIUM 10 MG/1
10 TABLET ORAL DAILY
Qty: 90 TABLET | Refills: 3 | Status: SHIPPED | OUTPATIENT
Start: 2022-07-18

## 2022-07-18 NOTE — PROGRESS NOTES
1. Have you been to the ER, urgent care clinic since your last visit? Hospitalized since your last visit? No    2. Have you seen or consulted any other health care providers outside of the [address] [place] [address] since your last visit? Include any pap smears or colon screening.  No

## 2022-07-18 NOTE — PROGRESS NOTES
Cali Wilson is a 23 y.o. male evaluated via telephone on 7/18/2022. Consent:  He and/or health care decision maker is aware that that he may receive a bill for this telephone service, depending on his insurance coverage, and has provided verbal consent to proceed: Yes      Documentation:  I communicated with the patient and/or health care decision maker about see below. Details of this discussion including any medical advice provided:      Assessment & Plan:   Diagnoses and all orders for this visit:    1. Mild intermittent asthma without complication  -     montelukast (SINGULAIR) 10 mg tablet; Take 1 Tablet by mouth daily. TAKE 1 TABLET BY MOUTH EVERY DAY          Follow-up and Dispositions    · Return in about 3 months (around 10/18/2022). I ADVISED PATIENT TO GO TO ER IF SYMPTOMS WORSEN , CHANGE OR FAILS TO IMPROVE. Vandana Esquivel is a 23 y.o. male who was seen for Follow-up (refill medication for singulair 10mg)      1. Mild intermittent asthma without complication  Patient is requesting refills of Singulair. He takes Singulair 10 mg daily. He reports no side effects from current dose. He reports asthma flareups on occasionally and exercise-induced. Prior to Admission medications    Medication Sig Start Date End Date Taking? Authorizing Provider   montelukast (SINGULAIR) 10 mg tablet Take 1 Tablet by mouth daily. TAKE 1 TABLET BY MOUTH EVERY DAY 7/18/22  Yes Ander Castro MD   escitalopram oxalate (LEXAPRO) 5 mg tablet  7/22/21  Yes Provider, Historical   hydrOXYzine pamoate (VISTARIL) 25 mg capsule  10/4/21  Yes Provider, Historical   busPIRone (BUSPAR) 7.5 mg tablet TAKE 1 TABLET BY MOUTH THREE TIMES A DAY 8/10/20  Yes Provider, Historical   albuterol (PROVENTIL HFA, VENTOLIN HFA, PROAIR HFA) 90 mcg/actuation inhaler Take 1 Puff by inhalation every four (4) hours as needed for Wheezing.  10/19/20  Yes Parag Calderon MD   baclofen (LIORESAL) 10 mg tablet TAKE 1 TABLET BY MOUTH 3 TIMES A DAY AS NEEDED FOR MOTOR TICS 8/14/19  Yes Provider, Historical   pimozide (ORAP) 1 mg tablet Take 1 mg by mouth. Yes Provider, Historical   loratadine (CLARITIN) 10 mg tablet Take 10 mg by mouth. Yes Provider, Historical   guanFACINE ER (INTUNIV) 4 mg Tb24 ER tablet Take  by mouth daily. Yes Provider, Historical   montelukast (SINGULAIR) 10 mg tablet Take 1 Tablet by mouth daily. TAKE 1 TABLET BY MOUTH EVERY DAY 10/20/21 7/18/22  Deion Maddox MD     Allergies   Allergen Reactions    Atomoxetine Hives    Doxycycline Other (comments)    Penicillins Hives    Sulfa (Sulfonamide Antibiotics) Hives       Patient Active Problem List   Diagnosis Code    Tourette syndrome F95.2    Scoliosis M41.9    Rectal bleeding K62.5    Headache R51.9    Constipation K59.00    Back pain M54.9    Asthma J45.909    Tourette's syndrome F95.2     Patient Active Problem List    Diagnosis Date Noted    Scoliosis 10/20/2021    Rectal bleeding 10/20/2021    Headache 10/20/2021    Constipation 10/20/2021    Back pain 10/20/2021    Asthma 10/20/2021    Tourette's syndrome 10/20/2021    Tourette syndrome 09/17/2019     Current Outpatient Medications   Medication Sig Dispense Refill    montelukast (SINGULAIR) 10 mg tablet Take 1 Tablet by mouth daily. TAKE 1 TABLET BY MOUTH EVERY DAY 90 Tablet 3    escitalopram oxalate (LEXAPRO) 5 mg tablet       hydrOXYzine pamoate (VISTARIL) 25 mg capsule       busPIRone (BUSPAR) 7.5 mg tablet TAKE 1 TABLET BY MOUTH THREE TIMES A DAY      albuterol (PROVENTIL HFA, VENTOLIN HFA, PROAIR HFA) 90 mcg/actuation inhaler Take 1 Puff by inhalation every four (4) hours as needed for Wheezing. 1 Inhaler 2    baclofen (LIORESAL) 10 mg tablet TAKE 1 TABLET BY MOUTH 3 TIMES A DAY AS NEEDED FOR MOTOR TICS  11    pimozide (ORAP) 1 mg tablet Take 1 mg by mouth.  loratadine (CLARITIN) 10 mg tablet Take 10 mg by mouth.       guanFACINE ER (INTUNIV) 4 mg Tb24 ER tablet Take  by mouth daily. Allergies   Allergen Reactions    Atomoxetine Hives    Doxycycline Other (comments)    Penicillins Hives    Sulfa (Sulfonamide Antibiotics) Hives     Past Medical History:   Diagnosis Date    Asthma     Headache      Past Surgical History:   Procedure Laterality Date    HX APPENDECTOMY      HX ORTHOPAEDIC      FOREIGN BODY REMOVED FROM WRIST     History reviewed. No pertinent family history. Social History     Tobacco Use    Smoking status: Never Smoker    Smokeless tobacco: Never Used   Substance Use Topics    Alcohol use: Yes       Review of Systems   Constitutional: Negative. HENT: Negative. Eyes: Negative. Respiratory: Negative. Cardiovascular: Negative. Gastrointestinal: Negative. Genitourinary: Negative. Musculoskeletal: Negative. Skin: Negative. Neurological: Negative. Endo/Heme/Allergies: Negative. Psychiatric/Behavioral: Negative. Patient was located at his home. I was at office while conducting this encounter. I affirm this is a Patient Initiated Episode with an Established Patient who has not had a related appointment within my department in the past 7 days or scheduled within the next 24 hours.     Total Time: minutes: 21-30 minutes    Note: not billable if this call serves to triage the patient into an appointment for the relevant concern      Ramon Quinones MD

## 2022-07-28 ENCOUNTER — OFFICE VISIT (OUTPATIENT)
Dept: FAMILY MEDICINE CLINIC | Age: 27
End: 2022-07-28
Payer: COMMERCIAL

## 2022-07-28 VITALS
BODY MASS INDEX: 27.28 KG/M2 | RESPIRATION RATE: 20 BRPM | TEMPERATURE: 97.3 F | SYSTOLIC BLOOD PRESSURE: 134 MMHG | OXYGEN SATURATION: 95 % | WEIGHT: 205.8 LBS | HEIGHT: 73 IN | HEART RATE: 95 BPM | DIASTOLIC BLOOD PRESSURE: 89 MMHG

## 2022-07-28 DIAGNOSIS — Z02.89 ENCOUNTER FOR COMPLETION OF FORM WITH PATIENT: Primary | ICD-10-CM

## 2022-07-28 PROCEDURE — 99213 OFFICE O/P EST LOW 20 MIN: CPT | Performed by: STUDENT IN AN ORGANIZED HEALTH CARE EDUCATION/TRAINING PROGRAM

## 2022-07-28 NOTE — PROGRESS NOTES
Patient stated name &   Chief Complaint   Patient presents with    Complete Physical     Work PE    Employee health screening        Health Maintenance Due   Topic    Hepatitis C Screening     Pneumococcal 0-64 years (1 - PCV)    COVID-19 Vaccine (3 - Booster for Moderna series)       Wt Readings from Last 3 Encounters:   22 205 lb 12.8 oz (93.4 kg)   10/20/21 188 lb 9.6 oz (85.5 kg)   10/19/20 179 lb 3.2 oz (81.3 kg)     Temp Readings from Last 3 Encounters:   22 97.3 °F (36.3 °C) (Temporal)   10/20/21 97.8 °F (36.6 °C) (Temporal)   10/19/20 98.1 °F (36.7 °C) (Oral)     BP Readings from Last 3 Encounters:   22 134/89   10/20/21 96/63   10/19/20 115/77     Pulse Readings from Last 3 Encounters:   22 95   10/20/21 72   10/19/20 79         Learning Assessment:  :     Learning Assessment 2019   PRIMARY LEARNER Patient   BARRIERS PRIMARY LEARNER NONE   CO-LEARNER CAREGIVER No   PRIMARY LANGUAGE ENGLISH   LEARNER PREFERENCE PRIMARY DEMONSTRATION   ANSWERED BY self   RELATIONSHIP SELF       Depression Screening:  :     3 most recent PHQ Screens 2022   Little interest or pleasure in doing things Not at all   Feeling down, depressed, irritable, or hopeless Not at all   Total Score PHQ 2 0       Fall Risk Assessment:  :     No flowsheet data found. Abuse Screening:  :     Abuse Screening Questionnaire 2022 10/20/2021 2019   Do you ever feel afraid of your partner? N N N   Are you in a relationship with someone who physically or mentally threatens you? N N N   Is it safe for you to go home?  Y Y Y       Coordination of Care Questionnaire:  :     1) Have you been to an emergency room, urgent care clinic since your last visit? no   Hospitalized since your last visit? no             2) Have you seen or consulted any other health care providers outside of [address] [place] [address] since your last visit? no  (Include any pap smears or colon screenings in this section.)    3) Do you have an Advance Directive on file? no  Are you interested in receiving information about Advance Directives? no    Patient is accompanied by self I have received verbal consent from Devendra Maldonado to discuss any/all medical information while they are present in the room.

## 2022-07-28 NOTE — PROGRESS NOTES
Vision Screening    Right eye Left eye Both eyes   Without correction 20/25 20/25 20/15   With correction

## 2022-07-28 NOTE — PROGRESS NOTES
Assessment/Plan:     Diagnoses and all orders for this visit:    1. Encounter for completion of form with patient  -Patient required completion of [employer] work physical assessment form.  -No abnormal physical exam findings. -Vision screen completed today. Unable to complete colorblindness screening due to being without testing materials.  -Patient has a history of mild intermittent asthma, primarily exercise-induced per patient  -Given patient states that most fumes and smells do not trigger his asthma and he can wear a mask without difficulty believe he can proceed with fit testing. However, I do recommend he has his albuterol inhaler on hand for when he is tested. Discussed expected course/resolution/complications of diagnosis in detail with patient. Medication risks/benefits/costs/interactions/alternatives discussed with patient. Pt expressed understanding with the diagnosis and plan. Subjective:      Devendra Maldonado is a 23 y.o. male who presents for had concerns including Complete Physical (Work PE    Employee health screening). Current Outpatient Medications   Medication Sig Dispense Refill    montelukast (SINGULAIR) 10 mg tablet Take 1 Tablet by mouth daily. TAKE 1 TABLET BY MOUTH EVERY DAY 90 Tablet 3    escitalopram oxalate (LEXAPRO) 5 mg tablet       busPIRone (BUSPAR) 7.5 mg tablet TAKE 1 TABLET BY MOUTH THREE TIMES A DAY      albuterol (PROVENTIL HFA, VENTOLIN HFA, PROAIR HFA) 90 mcg/actuation inhaler Take 1 Puff by inhalation every four (4) hours as needed for Wheezing. 1 Inhaler 2    baclofen (LIORESAL) 10 mg tablet TAKE 1 TABLET BY MOUTH 3 TIMES A DAY AS NEEDED FOR MOTOR TICS  11    pimozide (ORAP) 1 mg tablet Take 1 mg by mouth.      loratadine (CLARITIN) 10 mg tablet Take 10 mg by mouth.      guanFACINE ER (INTUNIV) 4 mg Tb24 ER tablet Take  by mouth daily.       hydrOXYzine pamoate (VISTARIL) 25 mg capsule          Allergies   Allergen Reactions    Atomoxetine Hives Doxycycline Other (comments)    Penicillins Hives    Sulfa (Sulfonamide Antibiotics) Hives     Past Medical History:   Diagnosis Date    Asthma     Headache      Past Surgical History:   Procedure Laterality Date    HX APPENDECTOMY      HX ORTHOPAEDIC      FOREIGN BODY REMOVED FROM WRIST     History reviewed. No pertinent family history. Social History     Socioeconomic History    Marital status: UNKNOWN     Spouse name: Not on file    Number of children: Not on file    Years of education: Not on file    Highest education level: Not on file   Occupational History    Not on file   Tobacco Use    Smoking status: Never    Smokeless tobacco: Never   Vaping Use    Vaping Use: Never used   Substance and Sexual Activity    Alcohol use: Yes    Drug use: No    Sexual activity: Never   Other Topics Concern    Not on file   Social History Narrative    Not on file     Social Determinants of Health     Financial Resource Strain: Not on file   Food Insecurity: Not on file   Transportation Needs: Not on file   Physical Activity: Not on file   Stress: Not on file   Social Connections: Not on file   Intimate Partner Violence: Not on file   Housing Stability: Not on file       Patient is a 69-year-old male who presents the office today for physical assessment form completion for work. 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 Patient has a history of mild intermittent asthma primarily exercise-induced that requires albuterol inhaler as needed. Aside from smoke he states that different smells or fumes do not trigger his asthma. He is able to wear a mask without difficulty. Patient denies any chest pain, shortness of breath, nausea, vomiting, headache, numbness, weakness or any other acute concerns today. ROS:   Review of Systems   Constitutional:  Negative for chills and fever. HENT:  Negative for congestion. Eyes:  Negative for blurred vision.    Respiratory:  Negative for cough and shortness of breath. Cardiovascular:  Negative for chest pain, palpitations and leg swelling. Gastrointestinal:  Negative for abdominal pain, nausea and vomiting. Genitourinary:  Negative for dysuria. Neurological:  Negative for dizziness, tingling and weakness. Objective:   Visit Vitals  /89 (BP 1 Location: Right arm, BP Patient Position: Sitting, BP Cuff Size: Adult)   Pulse 95   Temp 97.3 °F (36.3 °C) (Temporal)   Resp 20   Ht 6' 1\" (1.854 m)   Wt 205 lb 12.8 oz (93.4 kg)   SpO2 95%   BMI 27.15 kg/m²         Vitals and Nurse Documentation reviewed. Physical Exam  Exam conducted with a chaperone present. Constitutional:       General: He is not in acute distress. Appearance: Normal appearance. He is not toxic-appearing. HENT:      Head: Normocephalic and atraumatic. Right Ear: Tympanic membrane and ear canal normal.      Left Ear: Tympanic membrane and ear canal normal.      Nose: Nose normal.      Mouth/Throat:      Mouth: Mucous membranes are moist.      Pharynx: Oropharynx is clear. No oropharyngeal exudate or posterior oropharyngeal erythema. Eyes:      Conjunctiva/sclera: Conjunctivae normal.      Pupils: Pupils are equal, round, and reactive to light. Cardiovascular:      Rate and Rhythm: Normal rate and regular rhythm. Pulses: Normal pulses. Heart sounds: Normal heart sounds. No murmur heard. No gallop. Pulmonary:      Effort: Pulmonary effort is normal. No respiratory distress. Breath sounds: Normal breath sounds. No wheezing or rhonchi. Abdominal:      General: Bowel sounds are normal. There is no distension. Palpations: Abdomen is soft. Tenderness: There is no abdominal tenderness. There is no guarding. Hernia: There is no hernia in the left inguinal area or right inguinal area. Genitourinary:     Penis: Normal.       Testes: Normal.   Musculoskeletal:      Cervical back: Neck supple.       Right lower leg: No edema. Left lower leg: No edema. Comments: Full range of motion in bilateral upper and lower extremities. Lymphadenopathy:      Cervical: No cervical adenopathy. Neurological:      Mental Status: He is alert and oriented to person, place, and time.       Gait: Gait normal.

## 2023-05-20 RX ORDER — ESCITALOPRAM OXALATE 5 MG/1
TABLET ORAL
COMMUNITY
Start: 2021-07-22

## 2023-05-20 RX ORDER — GUANFACINE 4 MG/1
TABLET, EXTENDED RELEASE ORAL DAILY
COMMUNITY

## 2023-05-20 RX ORDER — ALBUTEROL SULFATE 90 UG/1
1 AEROSOL, METERED RESPIRATORY (INHALATION) EVERY 4 HOURS PRN
COMMUNITY
Start: 2020-10-19

## 2023-05-20 RX ORDER — MONTELUKAST SODIUM 10 MG/1
10 TABLET ORAL DAILY
COMMUNITY
Start: 2022-07-18

## 2023-05-20 RX ORDER — BUSPIRONE HYDROCHLORIDE 7.5 MG/1
1 TABLET ORAL 3 TIMES DAILY
COMMUNITY
Start: 2020-08-10

## 2023-05-20 RX ORDER — PIMOZIDE 1 MG/1
1 TABLET ORAL
COMMUNITY

## 2023-05-20 RX ORDER — BACLOFEN 10 MG/1
TABLET ORAL
COMMUNITY
Start: 2019-08-14

## 2023-05-20 RX ORDER — LORATADINE 10 MG/1
10 TABLET ORAL
COMMUNITY